# Patient Record
Sex: FEMALE
[De-identification: names, ages, dates, MRNs, and addresses within clinical notes are randomized per-mention and may not be internally consistent; named-entity substitution may affect disease eponyms.]

---

## 2017-09-03 ENCOUNTER — CHARTING TRANS (OUTPATIENT)
Dept: OTHER | Age: 32
End: 2017-09-03

## 2017-09-25 ENCOUNTER — OFFICE VISIT (OUTPATIENT)
Dept: OBGYN CLINIC | Facility: CLINIC | Age: 32
End: 2017-09-25

## 2017-09-25 VITALS
SYSTOLIC BLOOD PRESSURE: 103 MMHG | DIASTOLIC BLOOD PRESSURE: 67 MMHG | WEIGHT: 169 LBS | HEART RATE: 68 BPM | BODY MASS INDEX: 28 KG/M2

## 2017-09-25 DIAGNOSIS — B37.3 YEAST VAGINITIS: Primary | ICD-10-CM

## 2017-09-25 PROCEDURE — 99213 OFFICE O/P EST LOW 20 MIN: CPT | Performed by: ADVANCED PRACTICE MIDWIFE

## 2017-09-25 RX ORDER — FLUCONAZOLE 150 MG/1
150 TABLET ORAL
Qty: 2 TABLET | Refills: 0 | Status: SHIPPED | OUTPATIENT
Start: 2017-09-25 | End: 2017-09-28

## 2017-09-26 NOTE — PROGRESS NOTES
[facility] 58 Focused Gynecology Problem Exam    Kip Bryson is a 33year old female presenting for Gyn Exam (vaginal itching for the last 3-4 days,, Declined FLU vaccine)  .     HPI:   Patient presents with:  Gyn Exam: vaginal itching fo Social History Narrative   None on file       ROS:     History obtained from the patient  General ROS: negative  ENT ROS: negative  Breast ROS: negative  Gastrointestinal ROS: no abdominal pain, change in bowel habits, or black or bloody stools  Genito

## 2017-09-28 RX ORDER — FLUCONAZOLE 150 MG/1
150 TABLET ORAL ONCE
Qty: 1 TABLET | Refills: 0 | Status: SHIPPED | OUTPATIENT
Start: 2017-09-28 | End: 2017-09-28

## 2017-09-28 NOTE — TELEPHONE ENCOUNTER
Pt states she was given Diflucan #2 pills on 9/25/17. Pt states she took the first pill on 9/25/17 and was to repeat the dose today. Pt states she dropped the pill in the sink with  and was unable to take the pill. Pt is requesting we send Rx for another pill to her pharmacy.   Rx sent per request.

## 2017-12-12 ENCOUNTER — TELEPHONE (OUTPATIENT)
Dept: OBGYN CLINIC | Facility: CLINIC | Age: 32
End: 2017-12-12

## 2017-12-13 RX ORDER — FLUCONAZOLE 150 MG/1
150 TABLET ORAL
Qty: 2 TABLET | Refills: 0 | Status: SHIPPED | OUTPATIENT
Start: 2017-12-13 | End: 2017-12-17

## 2018-02-14 RX ORDER — FLUCONAZOLE 150 MG/1
TABLET ORAL
Qty: 2 TABLET | Refills: 0 | OUTPATIENT
Start: 2018-02-14

## 2018-02-19 ENCOUNTER — TELEPHONE (OUTPATIENT)
Dept: OBGYN CLINIC | Facility: CLINIC | Age: 33
End: 2018-02-19

## 2018-02-20 NOTE — TELEPHONE ENCOUNTER
Pt states she had appt with PCP yesterday and question was answered. Pt states no further assistance is needed at this time.

## 2018-03-12 ENCOUNTER — OFFICE VISIT (OUTPATIENT)
Dept: OBGYN CLINIC | Facility: CLINIC | Age: 33
End: 2018-03-12

## 2018-03-12 VITALS
HEART RATE: 70 BPM | HEIGHT: 63.75 IN | WEIGHT: 172.81 LBS | SYSTOLIC BLOOD PRESSURE: 108 MMHG | BODY MASS INDEX: 29.87 KG/M2 | DIASTOLIC BLOOD PRESSURE: 68 MMHG

## 2018-03-12 DIAGNOSIS — Z01.419 ENCOUNTER FOR ANNUAL ROUTINE GYNECOLOGICAL EXAMINATION: Primary | ICD-10-CM

## 2018-03-12 DIAGNOSIS — N89.8 VAGINAL ITCHING: ICD-10-CM

## 2018-03-12 PROCEDURE — 99395 PREV VISIT EST AGE 18-39: CPT | Performed by: ADVANCED PRACTICE MIDWIFE

## 2018-03-12 RX ORDER — DEXTROAMPHETAMINE SACCHARATE, AMPHETAMINE ASPARTATE MONOHYDRATE, DEXTROAMPHETAMINE SULFATE AND AMPHETAMINE SULFATE 3.75; 3.75; 3.75; 3.75 MG/1; MG/1; MG/1; MG/1
CAPSULE, EXTENDED RELEASE ORAL
Refills: 0 | COMMUNITY
Start: 2018-02-19 | End: 2021-11-29

## 2018-03-12 NOTE — PROGRESS NOTES
HPI:    Patient ID: [place] is a 33year old female. She presents for annual exam. She denies history of medical problems or any health complaints. Last pap 11/2016 normal with negative HPV. Reports periods are regular.  Patient's last menstr mass, no nipple discharge, no skin change and no tenderness. Left breast exhibits no inverted nipple, no mass, no nipple discharge, no skin change and no tenderness. Breasts are symmetrical.   Abdominal: Soft. She exhibits no mass. There is no tenderness.

## 2018-03-14 LAB
GENITAL VAGINOSIS SCREEN: NEGATIVE
TRICHOMONAS SCREEN: NEGATIVE

## 2018-03-15 ENCOUNTER — TELEPHONE (OUTPATIENT)
Dept: OBGYN CLINIC | Facility: CLINIC | Age: 33
End: 2018-03-15

## 2018-03-15 RX ORDER — FLUCONAZOLE 150 MG/1
150 TABLET ORAL ONCE
Qty: 2 TABLET | Refills: 0 | Status: SHIPPED | OUTPATIENT
Start: 2018-03-15 | End: 2018-03-15

## 2018-03-15 NOTE — TELEPHONE ENCOUNTER
Spoke with pt and advised per [facility] her vaginal culture is positive for yeast and Rx has been sent to her pharmacy. Pt advised she should take one pill today and repeat the dose in 72 hours. Pt agreed and voiced understanding.

## 2018-04-09 ENCOUNTER — OFFICE VISIT (OUTPATIENT)
Dept: OBGYN CLINIC | Facility: CLINIC | Age: 33
End: 2018-04-09

## 2018-04-09 VITALS
BODY MASS INDEX: 29 KG/M2 | SYSTOLIC BLOOD PRESSURE: 109 MMHG | WEIGHT: 166 LBS | HEART RATE: 81 BPM | DIASTOLIC BLOOD PRESSURE: 74 MMHG

## 2018-04-09 DIAGNOSIS — B37.3 VULVOVAGINITIS DUE TO YEAST: Primary | ICD-10-CM

## 2018-04-09 PROCEDURE — 99213 OFFICE O/P EST LOW 20 MIN: CPT | Performed by: ADVANCED PRACTICE MIDWIFE

## 2018-04-09 RX ORDER — FLUCONAZOLE 150 MG/1
150 TABLET ORAL WEEKLY
Qty: 28 TABLET | Refills: 0 | Status: SHIPPED | OUTPATIENT
Start: 2018-04-09 | End: 2018-10-06

## 2018-04-09 NOTE — PROGRESS NOTES
HPI:    Patient ID: [place] is a 33year old female. [place] presents with complaints of vaginal itching. The discharge and itching has been present for the past few days.  She reports recurrent yeast infections that occur after each body in the vagina. No signs of injury around the vagina. Vaginal discharge found. Genitourinary Comments: Small amount of white clumpy discharge present. Wet mount negative whiff, negative trich or clue cells. + hyphae.      Lymphadenopathy:        R

## 2018-06-26 ENCOUNTER — OFFICE VISIT (OUTPATIENT)
Dept: OBGYN CLINIC | Facility: CLINIC | Age: 33
End: 2018-06-26

## 2018-06-26 VITALS
HEIGHT: 63.75 IN | HEART RATE: 83 BPM | WEIGHT: 152.13 LBS | BODY MASS INDEX: 26.29 KG/M2 | SYSTOLIC BLOOD PRESSURE: 111 MMHG | DIASTOLIC BLOOD PRESSURE: 78 MMHG

## 2018-06-26 DIAGNOSIS — N89.8 VAGINAL DISCHARGE: Primary | ICD-10-CM

## 2018-06-26 DIAGNOSIS — N89.8 VAGINAL ITCHING: ICD-10-CM

## 2018-06-26 PROCEDURE — 87210 SMEAR WET MOUNT SALINE/INK: CPT | Performed by: ADVANCED PRACTICE MIDWIFE

## 2018-06-26 PROCEDURE — 99213 OFFICE O/P EST LOW 20 MIN: CPT | Performed by: ADVANCED PRACTICE MIDWIFE

## 2018-06-26 NOTE — PROGRESS NOTES
[facility] 58 Focused Gynecology Problem Exam    Moses Mariscal is a 33year old female presenting for Gyn Exam (Follow up visit, patient reports recurrent vaginal discharge and itch.)  .     HPI:   Patient presents with:  Gyn Exam: Follow u negative  ENT ROS: negative   Breast ROS: negative  Gastrointestinal ROS: negative  Genito-Urinary ROS: increased discharge, irritation      PHYSICAL EXAM:   /78   Pulse 83   Ht 5' 3.75\" (1.619 m)   Wt 152 lb 2 oz (69 kg)   LMP 06/18/2018   Breastfe

## 2018-10-18 ENCOUNTER — OFFICE VISIT (OUTPATIENT)
Dept: OBGYN CLINIC | Facility: CLINIC | Age: 33
End: 2018-10-18
Payer: COMMERCIAL

## 2018-10-18 VITALS
WEIGHT: 149.81 LBS | DIASTOLIC BLOOD PRESSURE: 73 MMHG | BODY MASS INDEX: 26 KG/M2 | HEART RATE: 76 BPM | SYSTOLIC BLOOD PRESSURE: 109 MMHG

## 2018-10-18 DIAGNOSIS — Z30.09 GENERAL COUNSELING FOR PRESCRIPTION OF ORAL CONTRACEPTIVES: Primary | ICD-10-CM

## 2018-10-18 PROCEDURE — 99213 OFFICE O/P EST LOW 20 MIN: CPT | Performed by: ADVANCED PRACTICE MIDWIFE

## 2018-10-19 RX ORDER — LEVONORGESTREL AND ETHINYL ESTRADIOL 0.1-0.02MG
1 KIT ORAL DAILY
Qty: 1 PACKAGE | Refills: 11 | Status: SHIPPED | OUTPATIENT
Start: 2018-10-19 | End: 2019-04-06

## 2018-10-19 NOTE — PROGRESS NOTES
HPI:   Silvia Walton is a 29year old female who presents for a  Birth control consult. Is in a long term monogamous relationship. Has been using condoms. Periods regular, LMP 10/8/18.        Recent Labs      11/23/16   1201  11/23/16   1204   TSH BMI 25.92 kg/m²   Body mass index is 25.92 kg/m².    GENERAL: well developed, well nourished, in no apparent distress, oriented x 3    Full PE deferred    ASSESSMENT AND PLAN:   Joanne Harrell is a 29year old female who presents for contraception  After

## 2018-10-19 NOTE — PATIENT INSTRUCTIONS
ORAL CONTRACEPTIVE PILL INSTRUCTION      When do I start my piills?    As soon as you  pills, as long as you have been using condoms consistently prior to starting and for 2 weeks after starting pills   -OR-   Start pills on the first day of your nex pills.  4. If you miss more than 2 pills. Call the office for instructions. When should I use additional contraception? 1. Use a back-up with the first pack of pills  2. Use a back-up if you miss more than one pill in a pack  3.  Use a back-up if you h

## 2018-11-01 ENCOUNTER — OFFICE VISIT (OUTPATIENT)
Dept: OBGYN CLINIC | Facility: CLINIC | Age: 33
End: 2018-11-01
Payer: COMMERCIAL

## 2018-11-01 VITALS
DIASTOLIC BLOOD PRESSURE: 76 MMHG | BODY MASS INDEX: 26 KG/M2 | SYSTOLIC BLOOD PRESSURE: 116 MMHG | HEART RATE: 73 BPM | WEIGHT: 149.19 LBS

## 2018-11-01 DIAGNOSIS — N92.6 IRREGULAR BLEEDING: ICD-10-CM

## 2018-11-01 DIAGNOSIS — L73.9 FOLLICULITIS: Primary | ICD-10-CM

## 2018-11-01 PROCEDURE — 99213 OFFICE O/P EST LOW 20 MIN: CPT | Performed by: ADVANCED PRACTICE MIDWIFE

## 2018-11-02 NOTE — PROGRESS NOTES
HPI:   Silvia Walton is a 29year old female who presents for a  Patient presents with:  Gyn Problem: x 1 month noted lumps in both underarms. Only painful to touch. No change in size. Denies breast mass or nipple discharge.   Reports no issues with bleeding and periods regular PSYCH: denies depression or anxiety  MUSCULOSKELETAL: denies pain or swelling    EXAM:   /76 (BP Location: Left arm, Patient Position: Sitting, Cuff Size: adult)   Pulse 73   Wt 149 lb 3.2 oz (67.7 kg)   LMP 10/28/2018 (E

## 2018-11-03 VITALS
SYSTOLIC BLOOD PRESSURE: 100 MMHG | DIASTOLIC BLOOD PRESSURE: 70 MMHG | TEMPERATURE: 99.1 F | BODY MASS INDEX: 28.79 KG/M2 | WEIGHT: 168.65 LBS | HEART RATE: 72 BPM | HEIGHT: 64 IN | RESPIRATION RATE: 16 BRPM

## 2018-11-03 PROBLEM — L73.9 FOLLICULITIS OF AXILLA: Status: ACTIVE | Noted: 2018-11-03

## 2019-01-31 ENCOUNTER — WALK IN (OUTPATIENT)
Dept: URGENT CARE | Age: 34
End: 2019-01-31

## 2019-01-31 VITALS
HEART RATE: 76 BPM | WEIGHT: 166.56 LBS | SYSTOLIC BLOOD PRESSURE: 108 MMHG | DIASTOLIC BLOOD PRESSURE: 70 MMHG | BODY MASS INDEX: 28.59 KG/M2 | TEMPERATURE: 98.5 F | RESPIRATION RATE: 14 BRPM

## 2019-01-31 DIAGNOSIS — H10.9 BACTERIAL CONJUNCTIVITIS: Primary | ICD-10-CM

## 2019-01-31 PROCEDURE — 99204 OFFICE O/P NEW MOD 45 MIN: CPT | Performed by: NURSE PRACTITIONER

## 2019-01-31 RX ORDER — POLYMYXIN B SULFATE AND TRIMETHOPRIM 1; 10000 MG/ML; [USP'U]/ML
1 SOLUTION OPHTHALMIC EVERY 6 HOURS
Qty: 1 BOTTLE | Refills: 0 | Status: SHIPPED | OUTPATIENT
Start: 2019-01-31 | End: 2019-02-07

## 2019-01-31 ASSESSMENT — ENCOUNTER SYMPTOMS
SORE THROAT: 0
FEVER: 0
RHINORRHEA: 0
ABDOMINAL PAIN: 0
CHILLS: 0
COUGH: 0
EYE PAIN: 0
PHOTOPHOBIA: 0

## 2019-04-06 ENCOUNTER — OFFICE VISIT (OUTPATIENT)
Dept: OBGYN CLINIC | Facility: CLINIC | Age: 34
End: 2019-04-06
Payer: COMMERCIAL

## 2019-04-06 VITALS
HEART RATE: 79 BPM | SYSTOLIC BLOOD PRESSURE: 113 MMHG | DIASTOLIC BLOOD PRESSURE: 78 MMHG | BODY MASS INDEX: 30 KG/M2 | WEIGHT: 173.38 LBS

## 2019-04-06 DIAGNOSIS — Z30.09 BIRTH CONTROL COUNSELING: ICD-10-CM

## 2019-04-06 DIAGNOSIS — Z01.419 WOMEN'S ANNUAL ROUTINE GYNECOLOGICAL EXAMINATION: Primary | ICD-10-CM

## 2019-04-06 DIAGNOSIS — Z12.4 SCREENING FOR CERVICAL CANCER: ICD-10-CM

## 2019-04-06 PROCEDURE — 99395 PREV VISIT EST AGE 18-39: CPT | Performed by: ADVANCED PRACTICE MIDWIFE

## 2019-04-06 RX ORDER — ETONOGESTREL AND ETHINYL ESTRADIOL 11.7; 2.7 MG/1; MG/1
1 INSERT, EXTENDED RELEASE VAGINAL
Qty: 1 EACH | Refills: 11 | Status: SHIPPED | OUTPATIENT
Start: 2019-04-06 | End: 2021-11-29

## 2019-04-06 NOTE — PROGRESS NOTES
Lolly Roger is a 29year old female. HPI:   Patient presents with:  Gyn Exam: Annual, last pap 11/2016 normal      Stopped pills because would forget to take and would cause spotting. Interested in [facility]. Declines STD testing. No problems. discharge, no skin change and no tenderness. Breasts are symmetrical.   Abdominal: Normal appearance. There is no hepatosplenomegaly. There is no tenderness. There is no CVA tenderness. Genitourinary: Vagina normal and uterus normal. No labial fusion.  Darryl Quezada

## 2019-05-05 ENCOUNTER — WALK IN (OUTPATIENT)
Dept: URGENT CARE | Age: 34
End: 2019-05-05

## 2019-05-05 VITALS
TEMPERATURE: 97.8 F | RESPIRATION RATE: 18 BRPM | SYSTOLIC BLOOD PRESSURE: 106 MMHG | OXYGEN SATURATION: 99 % | WEIGHT: 170 LBS | DIASTOLIC BLOOD PRESSURE: 82 MMHG | HEART RATE: 56 BPM | BODY MASS INDEX: 29.02 KG/M2 | HEIGHT: 64 IN

## 2019-05-05 DIAGNOSIS — H10.13 ALLERGIC CONJUNCTIVITIS OF BOTH EYES: Primary | ICD-10-CM

## 2019-05-05 PROCEDURE — 99213 OFFICE O/P EST LOW 20 MIN: CPT | Performed by: NURSE PRACTITIONER

## 2019-05-05 RX ORDER — ETONOGESTREL AND ETHINYL ESTRADIOL VAGINAL .015; .12 MG/D; MG/D
1 RING VAGINAL SEE ADMIN INSTRUCTIONS
COMMUNITY

## 2019-05-05 ASSESSMENT — ENCOUNTER SYMPTOMS
RESPIRATORY NEGATIVE: 1
EYE ITCHING: 1
NEUROLOGICAL NEGATIVE: 1
EYE DISCHARGE: 1
EYE PAIN: 0
PHOTOPHOBIA: 0
CONSTITUTIONAL NEGATIVE: 1
GASTROINTESTINAL NEGATIVE: 1
EYE REDNESS: 1

## 2020-03-26 ENCOUNTER — TELEPHONE (OUTPATIENT)
Dept: OBGYN CLINIC | Facility: CLINIC | Age: 35
End: 2020-03-26

## 2020-03-26 NOTE — TELEPHONE ENCOUNTER
Pt reports right sided pelvic pain by her ovary since yesterday. Pt states pain comes and goes and is dull. Pt states pain is worse with movement and rates pain a 5-6 out of 10. Pt states she is sexually active and uses condoms for birth control.  Pt also r

## 2020-03-27 NOTE — TELEPHONE ENCOUNTER
Pt states she took 2 pregnancy tests and both were negative. Pt states she is still having right sided pelvic pain. Advised pt I will send the message to CN for further rec's. Pt agreed and voiced understanding.

## 2020-03-27 NOTE — TELEPHONE ENCOUNTER
Virtual/Telephone Check-In    [place] verbally declines a Virtual/Telephone Check-In service on 03/27/20. Patient understands and accepts financial responsibility for any deductible, co-insurance and/or co-pays associated with this service.     D
